# Patient Record
Sex: MALE | Race: WHITE | NOT HISPANIC OR LATINO | URBAN - METROPOLITAN AREA
[De-identification: names, ages, dates, MRNs, and addresses within clinical notes are randomized per-mention and may not be internally consistent; named-entity substitution may affect disease eponyms.]

---

## 2019-03-22 ENCOUNTER — HOSPITAL ENCOUNTER (OUTPATIENT)
Facility: HOSPITAL | Age: 49
Setting detail: OBSERVATION
Discharge: HOME | End: 2019-03-23
Attending: EMERGENCY MEDICINE | Admitting: INTERNAL MEDICINE
Payer: COMMERCIAL

## 2019-03-22 ENCOUNTER — APPOINTMENT (EMERGENCY)
Dept: RADIOLOGY | Facility: HOSPITAL | Age: 49
End: 2019-03-22
Attending: EMERGENCY MEDICINE
Payer: COMMERCIAL

## 2019-03-22 DIAGNOSIS — R07.9 CHEST PAIN, UNSPECIFIED TYPE: Primary | ICD-10-CM

## 2019-03-22 LAB
ALBUMIN SERPL-MCNC: 4.1 G/DL (ref 3.4–5)
ALP SERPL-CCNC: 62 IU/L (ref 35–126)
ALT SERPL-CCNC: 22 IU/L (ref 16–63)
ANION GAP SERPL CALC-SCNC: 10 MEQ/L (ref 3–15)
AST SERPL-CCNC: 23 IU/L (ref 15–41)
BILIRUB SERPL-MCNC: 0.4 MG/DL (ref 0.3–1.2)
BUN SERPL-MCNC: 18 MG/DL (ref 8–20)
CALCIUM SERPL-MCNC: 9.4 MG/DL (ref 8.9–10.3)
CHLORIDE SERPL-SCNC: 101 MEQ/L (ref 98–109)
CO2 SERPL-SCNC: 28 MEQ/L (ref 22–32)
CREAT SERPL-MCNC: 1.2 MG/DL
ERYTHROCYTE [DISTWIDTH] IN BLOOD BY AUTOMATED COUNT: 12.7 % (ref 11.6–14.4)
GFR SERPL CREATININE-BSD FRML MDRD: >60 ML/MIN/1.73M*2
GLUCOSE SERPL-MCNC: 134 MG/DL (ref 70–99)
HCT VFR BLDCO AUTO: 43 %
HGB BLD-MCNC: 14.4 G/DL
LIPASE SERPL-CCNC: 40 U/L (ref 20–51)
MCH RBC QN AUTO: 28.7 PG (ref 28–33.2)
MCHC RBC AUTO-ENTMCNC: 33.5 G/DL (ref 32.2–36.5)
MCV RBC AUTO: 85.7 FL (ref 83–98)
PDW BLD AUTO: 10.4 FL (ref 9.4–12.4)
PLATELET # BLD AUTO: 284 K/UL
POTASSIUM SERPL-SCNC: 3.9 MEQ/L (ref 3.6–5.1)
PROT SERPL-MCNC: 7.3 G/DL (ref 6–8.2)
RBC # BLD AUTO: 5.02 M/UL (ref 4.5–5.8)
SODIUM SERPL-SCNC: 139 MEQ/L (ref 136–144)
TROPONIN I SERPL-MCNC: <0.02 NG/ML
WBC # BLD AUTO: 8.67 K/UL

## 2019-03-22 PROCEDURE — 3E033NZ INTRODUCTION OF ANALGESICS, HYPNOTICS, SEDATIVES INTO PERIPHERAL VEIN, PERCUTANEOUS APPROACH: ICD-10-PCS | Performed by: INTERNAL MEDICINE

## 2019-03-22 PROCEDURE — 63700000 HC SELF-ADMINISTRABLE DRUG: Performed by: EMERGENCY MEDICINE

## 2019-03-22 PROCEDURE — 3E0337Z INTRODUCTION OF ELECTROLYTIC AND WATER BALANCE SUBSTANCE INTO PERIPHERAL VEIN, PERCUTANEOUS APPROACH: ICD-10-PCS | Performed by: INTERNAL MEDICINE

## 2019-03-22 PROCEDURE — 84484 ASSAY OF TROPONIN QUANT: CPT | Performed by: EMERGENCY MEDICINE

## 2019-03-22 PROCEDURE — 93005 ELECTROCARDIOGRAM TRACING: CPT | Performed by: EMERGENCY MEDICINE

## 2019-03-22 PROCEDURE — 93005 ELECTROCARDIOGRAM TRACING: CPT | Performed by: PHYSICIAN ASSISTANT

## 2019-03-22 PROCEDURE — 25800000 HC PHARMACY IV SOLUTIONS: Performed by: EMERGENCY MEDICINE

## 2019-03-22 PROCEDURE — 63600000 HC DRUGS/DETAIL CODE: Performed by: PHYSICIAN ASSISTANT

## 2019-03-22 PROCEDURE — 3E0337Z INTRODUCTION OF ELECTROLYTIC AND WATER BALANCE SUBSTANCE INTO PERIPHERAL VEIN, PERCUTANEOUS APPROACH: ICD-10-PCS | Performed by: EMERGENCY MEDICINE

## 2019-03-22 PROCEDURE — 63700000 HC SELF-ADMINISTRABLE DRUG: Performed by: PHYSICIAN ASSISTANT

## 2019-03-22 PROCEDURE — 71046 X-RAY EXAM CHEST 2 VIEWS: CPT

## 2019-03-22 PROCEDURE — 99220 PR INITIAL OBSERVATION CARE/DAY 70 MINUTES: CPT | Performed by: INTERNAL MEDICINE

## 2019-03-22 PROCEDURE — 80053 COMPREHEN METABOLIC PANEL: CPT | Performed by: EMERGENCY MEDICINE

## 2019-03-22 PROCEDURE — 25000000 HC PHARMACY GENERAL: Performed by: PHYSICIAN ASSISTANT

## 2019-03-22 PROCEDURE — 83690 ASSAY OF LIPASE: CPT | Performed by: EMERGENCY MEDICINE

## 2019-03-22 PROCEDURE — 96374 THER/PROPH/DIAG INJ IV PUSH: CPT

## 2019-03-22 PROCEDURE — G0378 HOSPITAL OBSERVATION PER HR: HCPCS

## 2019-03-22 PROCEDURE — 99285 EMERGENCY DEPT VISIT HI MDM: CPT | Mod: 25

## 2019-03-22 PROCEDURE — 3E0333Z INTRODUCTION OF ANTI-INFLAMMATORY INTO PERIPHERAL VEIN, PERCUTANEOUS APPROACH: ICD-10-PCS | Performed by: INTERNAL MEDICINE

## 2019-03-22 PROCEDURE — 3E033GC INTRODUCTION OF OTHER THERAPEUTIC SUBSTANCE INTO PERIPHERAL VEIN, PERCUTANEOUS APPROACH: ICD-10-PCS | Performed by: INTERNAL MEDICINE

## 2019-03-22 PROCEDURE — 96361 HYDRATE IV INFUSION ADD-ON: CPT

## 2019-03-22 PROCEDURE — 36415 COLL VENOUS BLD VENIPUNCTURE: CPT | Performed by: EMERGENCY MEDICINE

## 2019-03-22 PROCEDURE — 85027 COMPLETE CBC AUTOMATED: CPT | Performed by: EMERGENCY MEDICINE

## 2019-03-22 RX ORDER — ALUMINUM HYDROXIDE, MAGNESIUM HYDROXIDE, AND SIMETHICONE 1200; 120; 1200 MG/30ML; MG/30ML; MG/30ML
30 SUSPENSION ORAL ONCE
Status: COMPLETED | OUTPATIENT
Start: 2019-03-22 | End: 2019-03-22

## 2019-03-22 RX ORDER — TIMOLOL MALEATE 5 MG/ML
SOLUTION/ DROPS OPHTHALMIC
Refills: 3 | COMMUNITY
Start: 2019-02-18

## 2019-03-22 RX ORDER — METOPROLOL TARTRATE 1 MG/ML
5 INJECTION, SOLUTION INTRAVENOUS ONCE
Status: COMPLETED | OUTPATIENT
Start: 2019-03-22 | End: 2019-03-22

## 2019-03-22 RX ORDER — NAPROXEN SODIUM 220 MG/1
324 TABLET, FILM COATED ORAL ONCE
Status: COMPLETED | OUTPATIENT
Start: 2019-03-22 | End: 2019-03-22

## 2019-03-22 RX ORDER — TRAVOPROST 0.04 MG/ML
SOLUTION/ DROPS OPHTHALMIC
COMMUNITY
Start: 2018-02-15 | End: 2019-03-23

## 2019-03-22 RX ORDER — NITROGLYCERIN 0.3 MG/1
0.3 TABLET SUBLINGUAL EVERY 5 MIN PRN
Status: DISCONTINUED | OUTPATIENT
Start: 2019-03-22 | End: 2019-03-23 | Stop reason: HOSPADM

## 2019-03-22 RX ORDER — MORPHINE SULFATE 2 MG/ML
2 INJECTION, SOLUTION INTRAMUSCULAR; INTRAVENOUS ONCE
Status: COMPLETED | OUTPATIENT
Start: 2019-03-22 | End: 2019-03-22

## 2019-03-22 RX ADMIN — NITROGLYCERIN 0.3 MG: 0.3 TABLET SUBLINGUAL at 21:17

## 2019-03-22 RX ADMIN — NITROGLYCERIN 0.3 MG: 0.3 TABLET SUBLINGUAL at 21:22

## 2019-03-22 RX ADMIN — NITROGLYCERIN 1 INCH: 20 OINTMENT TOPICAL at 21:34

## 2019-03-22 RX ADMIN — MORPHINE SULFATE 2 MG: 2 INJECTION, SOLUTION INTRAMUSCULAR; INTRAVENOUS at 21:34

## 2019-03-22 RX ADMIN — ALUMINUM HYDROXIDE, MAGNESIUM HYDROXIDE, AND SIMETHICONE 30 ML: 200; 200; 20 SUSPENSION ORAL at 23:30

## 2019-03-22 RX ADMIN — ASPIRIN 81 MG 324 MG: 81 TABLET ORAL at 21:08

## 2019-03-22 RX ADMIN — NITROGLYCERIN 0.3 MG: 0.3 TABLET SUBLINGUAL at 21:10

## 2019-03-22 RX ADMIN — METOPROLOL TARTRATE 5 MG: 1 INJECTION, SOLUTION INTRAVENOUS at 23:31

## 2019-03-22 RX ADMIN — SODIUM CHLORIDE 500 ML: 9 INJECTION, SOLUTION INTRAVENOUS at 21:13

## 2019-03-22 ASSESSMENT — ENCOUNTER SYMPTOMS
HEADACHES: 0
NECK PAIN: 0
DIZZINESS: 0
NAUSEA: 0
ABDOMINAL PAIN: 0
NUMBNESS: 0
VOMITING: 0
TREMORS: 0

## 2019-03-23 VITALS
OXYGEN SATURATION: 96 % | HEIGHT: 67 IN | HEART RATE: 84 BPM | DIASTOLIC BLOOD PRESSURE: 79 MMHG | TEMPERATURE: 97.5 F | RESPIRATION RATE: 18 BRPM | SYSTOLIC BLOOD PRESSURE: 122 MMHG | WEIGHT: 206 LBS | BODY MASS INDEX: 32.33 KG/M2

## 2019-03-23 PROBLEM — H40.9 GLAUCOMA: Status: ACTIVE | Noted: 2019-03-23

## 2019-03-23 PROBLEM — G47.33 OSA ON CPAP: Status: ACTIVE | Noted: 2019-03-23

## 2019-03-23 PROBLEM — R94.31 ABNORMAL ELECTROCARDIOGRAM: Status: ACTIVE | Noted: 2019-03-23

## 2019-03-23 LAB
ATRIAL RATE: 75
ATRIAL RATE: 86
ATRIAL RATE: 90
ATRIAL RATE: 92
CHOLEST SERPL-MCNC: 163 MG/DL
HDLC SERPL-MCNC: 73 MG/DL
HDLC SERPL: 2.2 {RATIO}
LDLC SERPL CALC-MCNC: 83 MG/DL
NONHDLC SERPL-MCNC: 90 MG/DL
P AXIS: 15
P AXIS: 17
P AXIS: 28
P AXIS: 34
PR INTERVAL: 156
PR INTERVAL: 158
PR INTERVAL: 166
PR INTERVAL: 174
QRS DURATION: 100
QRS DURATION: 102
QRS DURATION: 104
QRS DURATION: 98
QT INTERVAL: 344
QT INTERVAL: 356
QT INTERVAL: 380
QT INTERVAL: 384
QTC CALCULATION(BAZETT): 420
QTC CALCULATION(BAZETT): 424
QTC CALCULATION(BAZETT): 440
QTC CALCULATION(BAZETT): 459
R AXIS: 23
R AXIS: 25
R AXIS: 41
R AXIS: 52
T WAVE AXIS: 41
T WAVE AXIS: 43
T WAVE AXIS: 48
T WAVE AXIS: 48
TRIGL SERPL-MCNC: 35 MG/DL (ref 30–149)
TROPONIN I SERPL-MCNC: <0.02 NG/ML
TROPONIN I SERPL-MCNC: <0.02 NG/ML
VENTRICULAR RATE: 75
VENTRICULAR RATE: 86
VENTRICULAR RATE: 90
VENTRICULAR RATE: 92

## 2019-03-23 PROCEDURE — G0378 HOSPITAL OBSERVATION PER HR: HCPCS

## 2019-03-23 PROCEDURE — 80061 LIPID PANEL: CPT | Performed by: INTERNAL MEDICINE

## 2019-03-23 PROCEDURE — 25000000 HC PHARMACY GENERAL: Performed by: INTERNAL MEDICINE

## 2019-03-23 PROCEDURE — 96361 HYDRATE IV INFUSION ADD-ON: CPT

## 2019-03-23 PROCEDURE — 94660 CPAP INITIATION&MGMT: CPT

## 2019-03-23 PROCEDURE — 36415 COLL VENOUS BLD VENIPUNCTURE: CPT | Performed by: INTERNAL MEDICINE

## 2019-03-23 PROCEDURE — 84484 ASSAY OF TROPONIN QUANT: CPT | Performed by: INTERNAL MEDICINE

## 2019-03-23 PROCEDURE — 63600000 HC DRUGS/DETAIL CODE: Performed by: INTERNAL MEDICINE

## 2019-03-23 PROCEDURE — 96375 TX/PRO/DX INJ NEW DRUG ADDON: CPT

## 2019-03-23 PROCEDURE — 96374 THER/PROPH/DIAG INJ IV PUSH: CPT

## 2019-03-23 PROCEDURE — 63700000 HC SELF-ADMINISTRABLE DRUG: Performed by: INTERNAL MEDICINE

## 2019-03-23 PROCEDURE — 96376 TX/PRO/DX INJ SAME DRUG ADON: CPT

## 2019-03-23 PROCEDURE — 93005 ELECTROCARDIOGRAM TRACING: CPT | Performed by: INTERNAL MEDICINE

## 2019-03-23 PROCEDURE — 99217 PR OBSERVATION CARE DISCHARGE MANAGEMENT: CPT | Performed by: HOSPITALIST

## 2019-03-23 RX ORDER — MORPHINE SULFATE 2 MG/ML
2 INJECTION, SOLUTION INTRAMUSCULAR; INTRAVENOUS ONCE
Status: COMPLETED | OUTPATIENT
Start: 2019-03-23 | End: 2019-03-23

## 2019-03-23 RX ORDER — COLCHICINE 0.6 MG/1
0.6 TABLET ORAL DAILY
Qty: 30 TABLET | Refills: 0 | Status: SHIPPED | OUTPATIENT
Start: 2019-03-23 | End: 2019-04-22

## 2019-03-23 RX ORDER — TRAVOPROST OPHTHALMIC SOLUTION 0.04 MG/ML
1 SOLUTION OPHTHALMIC NIGHTLY
COMMUNITY

## 2019-03-23 RX ORDER — COLCHICINE 0.6 MG/1
0.6 TABLET ORAL ONCE
Status: COMPLETED | OUTPATIENT
Start: 2019-03-23 | End: 2019-03-23

## 2019-03-23 RX ORDER — ACETAMINOPHEN 325 MG/1
650 TABLET ORAL EVERY 4 HOURS PRN
Status: DISCONTINUED | OUTPATIENT
Start: 2019-03-23 | End: 2019-03-23 | Stop reason: HOSPADM

## 2019-03-23 RX ORDER — ALUMINUM HYDROXIDE, MAGNESIUM HYDROXIDE, AND SIMETHICONE 1200; 120; 1200 MG/30ML; MG/30ML; MG/30ML
30 SUSPENSION ORAL EVERY 4 HOURS PRN
Status: DISCONTINUED | OUTPATIENT
Start: 2019-03-23 | End: 2019-03-23 | Stop reason: HOSPADM

## 2019-03-23 RX ORDER — TIMOLOL MALEATE 5 MG/ML
1 SOLUTION/ DROPS OPHTHALMIC DAILY
Status: DISCONTINUED | OUTPATIENT
Start: 2019-03-23 | End: 2019-03-23 | Stop reason: HOSPADM

## 2019-03-23 RX ORDER — ASPIRIN 325 MG
325 TABLET, DELAYED RELEASE (ENTERIC COATED) ORAL DAILY
Status: DISCONTINUED | OUTPATIENT
Start: 2019-03-23 | End: 2019-03-23 | Stop reason: HOSPADM

## 2019-03-23 RX ORDER — ACETAMINOPHEN 650 MG/20.3ML
650 LIQUID ORAL EVERY 4 HOURS PRN
Status: DISCONTINUED | OUTPATIENT
Start: 2019-03-23 | End: 2019-03-23 | Stop reason: HOSPADM

## 2019-03-23 RX ORDER — METOPROLOL TARTRATE 25 MG/1
25 TABLET, FILM COATED ORAL 2 TIMES DAILY
Status: DISCONTINUED | OUTPATIENT
Start: 2019-03-23 | End: 2019-03-23 | Stop reason: HOSPADM

## 2019-03-23 RX ORDER — NAPROXEN 500 MG/1
500 TABLET ORAL 2 TIMES DAILY WITH MEALS
Qty: 30 TABLET | Refills: 0 | Status: SHIPPED | OUTPATIENT
Start: 2019-03-23 | End: 2019-04-07

## 2019-03-23 RX ORDER — IBUPROFEN 200 MG
16-32 TABLET ORAL AS NEEDED
Status: DISCONTINUED | OUTPATIENT
Start: 2019-03-23 | End: 2019-03-23 | Stop reason: HOSPADM

## 2019-03-23 RX ORDER — KETOROLAC TROMETHAMINE 15 MG/ML
15 INJECTION, SOLUTION INTRAMUSCULAR; INTRAVENOUS ONCE
Status: COMPLETED | OUTPATIENT
Start: 2019-03-23 | End: 2019-03-23

## 2019-03-23 RX ORDER — POTASSIUM CHLORIDE 14.9 MG/ML
20 INJECTION INTRAVENOUS AS NEEDED
Status: DISCONTINUED | OUTPATIENT
Start: 2019-03-23 | End: 2019-03-23 | Stop reason: HOSPADM

## 2019-03-23 RX ORDER — FAMOTIDINE 10 MG/ML
20 INJECTION INTRAVENOUS ONCE
Status: COMPLETED | OUTPATIENT
Start: 2019-03-23 | End: 2019-03-23

## 2019-03-23 RX ORDER — ACETAMINOPHEN 650 MG/1
650 SUPPOSITORY RECTAL EVERY 4 HOURS PRN
Status: DISCONTINUED | OUTPATIENT
Start: 2019-03-23 | End: 2019-03-23 | Stop reason: HOSPADM

## 2019-03-23 RX ORDER — DEXTROSE 40 %
15-30 GEL (GRAM) ORAL AS NEEDED
Status: DISCONTINUED | OUTPATIENT
Start: 2019-03-23 | End: 2019-03-23 | Stop reason: HOSPADM

## 2019-03-23 RX ORDER — DEXTROSE 50 % IN WATER (D50W) INTRAVENOUS SYRINGE
25 AS NEEDED
Status: DISCONTINUED | OUTPATIENT
Start: 2019-03-23 | End: 2019-03-23 | Stop reason: HOSPADM

## 2019-03-23 RX ADMIN — COLCHICINE 0.6 MG: 0.6 TABLET, FILM COATED ORAL at 04:00

## 2019-03-23 RX ADMIN — METOPROLOL TARTRATE 25 MG: 25 TABLET, FILM COATED ORAL at 07:07

## 2019-03-23 RX ADMIN — KETOROLAC TROMETHAMINE 15 MG: 15 INJECTION, SOLUTION INTRAMUSCULAR; INTRAVENOUS at 03:59

## 2019-03-23 RX ADMIN — MORPHINE SULFATE 2 MG: 2 INJECTION, SOLUTION INTRAMUSCULAR; INTRAVENOUS at 00:49

## 2019-03-23 RX ADMIN — FAMOTIDINE 20 MG: 10 INJECTION INTRAVENOUS at 00:48

## 2019-03-23 RX ADMIN — TIMOLOL MALEATE 1 DROP: 5 SOLUTION/ DROPS OPHTHALMIC at 09:14

## 2019-03-23 RX ADMIN — ASPIRIN 325 MG: 325 TABLET, DELAYED RELEASE ORAL at 09:10

## 2019-03-23 ASSESSMENT — COGNITIVE AND FUNCTIONAL STATUS - GENERAL
DRESSING REGULAR UPPER BODY CLOTHING: 4 - NONE
DRESSING REGULAR LOWER BODY CLOTHING: 4 - NONE
STANDING UP FROM CHAIR USING ARMS: 4 - NONE
HELP NEEDED FOR PERSONAL GROOMING: 4 - NONE
HELP NEEDED FOR BATHING: 4 - NONE
MOVING TO AND FROM BED TO CHAIR: 4 - NONE
TOILETING: 4 - NONE
EATING MEALS: 4 - NONE
CLIMB 3 TO 5 STEPS WITH RAILING: 4 - NONE
WALKING IN HOSPITAL ROOM: 4 - NONE

## 2019-03-23 NOTE — PLAN OF CARE
Problem: Patient Care Overview  Goal: Plan of Care Review  Outcome: Ongoing (interventions implemented as appropriate)   03/23/19 1024   Coping/Psychosocial   Plan Of Care Reviewed With patient;spouse   Plan of Care Review   Progress improving   Outcome Summary Patient seen by cardiology, pericarditis per cardiology, chest discomfort improved with cholchine, for discharge home     Goal: Discharge Needs Assessment  Outcome: Ongoing (interventions implemented as appropriate)      Problem: Cardiac: ACS (Acute Coronary Syndrome) (Adult)  Goal: Signs and Symptoms of Listed Potential Problems Will be Absent, Minimized or Managed (Cardiac: ACS)  Outcome: Ongoing (interventions implemented as appropriate)

## 2019-03-23 NOTE — DISCHARGE INSTRUCTIONS
You were admitted to the hospital with pericarditis, which is inflammation of the lining of the heart . You were seen by Dr. Cheatham with cardiology who recommended anti-inflammatory medications and follow up in his office in 2 weeks. Continue the colchicine at least until you see Dr. Cheatham, and you can stop the naproxen after 3-5 days or taper it down to once per day when you start feeling better.    Medication changes:    CONTINUE all home meds    START colchicine 0.6 mg once per day -- this has been sent to your pharmacy  START naproxen 500 mg twice per day -- this has been sent to your pharmacy    Follow up plan:    See your PCP, Dr. To, within the next week    Follow up with Dr. Cheatham in 2 weeks, at which point he will want to get imaging of your heart with an echocardiogram, and he will also recommend whether or not to continue with colchicine. Call his office on Monday at [phone] to schedule an appointment.

## 2019-03-23 NOTE — ED ATTESTATION NOTE
I have personally seen, evaluated,and participated in the management of Pawel Doshi.  I reviewed and agree with the PA/NP's assessment and plan of care with the following exceptions: None    My examination, assessment, and plan of care for Pawel Doshi:  48-year-old male with a history of glaucoma kidney stones status post lithotripsy and also significant family history of early coronary disease presented with left-sided chest pain indigestion feeling central chest burning sensation that is since radiated to his back with aching sensation.  Exam:  Awake and alert mildly anxious, regular rate and rhythm without any no respiratory distress clear to auscultation no pedal edema.  Plan:  Monitorings, sublingual nitroglycerin along with aspirin chewed, EKG chest x-ray labs     Deon Topete MD  03/22/19 2057

## 2019-03-23 NOTE — NURSING NOTE
Spoke with Dr. Babin regarding pt's repeat EKG stating ST elevation/acute STEMI/MI. EKG sent to . Patient still reporting 3/10 chest pain described as tightness. Woken up from sleep to obtain EKG and timed blood work. Pt denies other symptoms with chest pain, no radiating pain.

## 2019-03-23 NOTE — DISCHARGE SUMMARY
"   Hospital Medicine Service -  Inpatient Discharge Summary        BRIEF OVERVIEW   Admitting Provider: Génesis Babin DO  Attending Provider: Vanita Hackett DO Attending phys phone: [phone]    PCP: Floyd To [phone]    Admission Date: 3/22/2019  Discharge Date: 3/23/2019     DISCHARGE DIAGNOSES      Primary Discharge Diagnosis  Chest pain    Secondary Discharge Diagnoses  Active Hospital Problems    Diagnosis Date Noted   • MIRIAM on CPAP 03/23/2019   • Glaucoma 03/23/2019   • Abnormal electrocardiogram 03/23/2019   • Chest pain 03/22/2019      Resolved Hospital Problems    Diagnosis Date Noted Date Resolved   No resolved problems to display.       Problem List on Day of Discharge  Glaucoma   Assessment & Plan    Continue eye drops at discharge        MIRIAM on CPAP   Assessment & Plan    Continue CPAP at discharge        * Chest pain   Assessment & Plan    EKG - T wave inversions in V1-V4.   BP elevated to 160's on arrival which has improved.  ACS vs GI vs Musculoskeletal vs pericarditis  Repeat EKG since admission more consistent with pericarditis  Seen by cardiology, likely pericarditis  Troponins negative x 3  Will dc on colchicine and naproxen          SUMMARY OF HOSPITALIZATION      Presenting Problem/History of Present Illness  Chest pain    This is a 48 y.o. year-old male admitted on 3/22/2019 with Chest pain [R07.9]  Chest pain, unspecified type [R07.9].    Pawel Doshi is a 48 y.o. male with a past medical history of glaucoma who presents with chest pain.   Patient states as he was driving home around 5:30pm felt \"noticeable\" pain on L side of chest described as \"burning\" rated 1-2/10. No other symptoms- SOB, diaphoresis, nausea, radiation of pain, dizziness, lightheadedness, HA. He got home around 6PM and some TUMS. He helped make dinner. Ate dinner consisting of salad, chicken nuggets with BBq sauce and a potato dish around 6:30pm. After dinner around 7:30pm sat down to watch a movie. " "About 1/2 hr later the pain \"grew\" and changed. Pain was going through his back into his shoulder. Pain described as tightness/aching 7-8/10. He says he walked around and did some stretches but the pain didn't go away. Had no other symptoms other than +chills and anxiety.   By 8:30PM as pain didn't ease up he came to ED. He took a baby ASA on the way.     He says he continued to have same level of pain on arrival to ED. In ED he was given 3 additional baby ASA and 3SL nitro and morphine 2mg and 1 inch Nitro paste  EKG showed anterior T wave inversions.     ED staff spoke with Dr. Cheatham who advised MAALOX, dose of IV LOPRESSOR then 25mg BID.     Patient states currently pain is \"noticeable\" 2-3/10.      States had similar symptoms yesterday. He was walking from [facility] to [facility] with a heavy bag. States had 1-2/10 \"noticeable\" left chest discomfort. He says resolved within minutes of getting to his destination.  He thought it was food related.  Prior to yesterday no episodes of CP.     Pain is not worsened with movement or positions or deep breathing.  No recent respiratory infections other than c/o postnasal drip.     Denies having cardiac stress test or ECHO.     Otherwise denies abd pain, n/v/d, dark or tarry or bloody stools. Denies urinary symptoms. Denies HA, lightheadedness, or dizziness.       Hospital Course    Patient was admitted to observation and repeat EKG noted MI in inferior leads with <1 mm ST elevation in aVF and continued TWI in V1-V4. Case was discussed with cardiology overnight and feeling was that EKG findings were more consistent with pericarditis. All troponins came back <0.02. Patient was given Toradol 15 mg and colchicine 0.6 mg. He was seen by cardiology the next day and confirmed to likely have pericarditis. He will be discharged today in stable condition on colchicine 0.6 mg daily and naproxen 500 BID and he is to wean the naproxen when his pain starts to improve. Follow up " with Dr. Cheatham in 2 weeks for echo. On morning of admission patient was resting comfortably and complaining of only 1-2/10 chest with no shortness of breath, no diaphoresis, no radiation to back or arm.    Exam on Day of Discharge  Physical Exam     Gen: NAD, appears stated age  HEENT: normocephalic, atraumatic  Pulm: CTABL, unlabored breathing, no wheeze/rales/rhonchi  CV: RRR, S1/S2 normal, intact distal pulses, no murmur/rub/gallop  No chest wall tenderness  Abd: soft, NT/ND, bowel sounds normal, no rebound/guarding  MSK: no injury or deformity  Neuro: AAOx3, non-focal  Psych: appropriate, cooperative  Ext: no cyanosis, clubbing, edema  Skin: warm, dry, intact      Consults During Admission  IP CONSULT TO CARDIOLOGY    DISCHARGE MEDICATIONS        Medication List      START taking these medications    colchicine 0.6 mg tablet  Commonly known as:  COLCRYS  Take 1 tablet (0.6 mg total) by mouth daily.     naproxen 500 mg tablet  Commonly known as:  NAPROSYN  Take 1 tablet (500 mg total) by mouth 2 (two) times a day with meals for 15 days.        CONTINUE taking these medications    GINKGO BILOBA ORAL  Take by mouth daily.     RED WINE COMPLEX 200-60 mg capsule  Generic drug:  resver-wine-bfl-pchyt-is-E-grp  Take by mouth.     THERALITH XR 3.75-45-45-49.5 mg tablet extended release  Generic drug:  vit B6-mag cit,oxid-potass cit  Take by mouth daily.     timolol 0.5 % ophthalmic solution  Commonly known as:  TIMOPTIC  INSTILL 1 DROP INTO BOTH EYES EVERY DAY     TRAVATAN Z 0.004 % drops  Generic drug:  travoprost  Administer 1 drop into both eyes nightly.     TURMERIC ORAL  Take by mouth daily.            Instructions for after discharge     Follow-up with Provider:       Instructions for follow-up:  Follow up with Dr. Cheatham in 2 weeks, call on Monday to schedule appointment    Follow-up with primary physician (PCP)       Instructions for follow-up:  follow up with Dr. To within the week              PROCEDURES / LABS / IMAGING      Operative Procedures  none    Other Procedures  none    Pertinent Labs    ABG        Troponins + BNP    Results from last 7 days  Lab Units 03/23/19  0849 03/23/19  0308 03/22/19 2109   TROPONIN I ng/mL <0.02 <0.02 <0.02       BMP + Lipase + Magnesium + Phosphate + TSH    Results from last 7 days  Lab Units 03/22/19  2109   SODIUM mEQ/L 139   POTASSIUM mEQ/L 3.9   CHLORIDE mEQ/L 101   CO2 mEQ/L 28   BUN mg/dL 18   CREATININE mg/dL 1.2   GLUCOSE mg/dL 134*   CALCIUM mg/dL 9.4       Complete Blood Count    Results from last 7 days  Lab Units 03/22/19  2109   WBC K/uL 8.67   RBC M/uL 5.02   HEMOGLOBIN g/dL 14.4   HEMATOCRIT % 43.0   MCV fL 85.7   MCH pg 28.7   MCHC g/dL 33.5   PLATELETS K/uL 284       Lipid Panel + Lipase    Results from last 7 days  Lab Units 03/23/19  0308 03/22/19 2109   CHOLESTEROL mg/dL 163  --    TRIGLYCERIDES mg/dL 35  --    HDL mg/dL 73  --    LDL CALC mg/dL 83  --    LIPASE U/L  --  40       Liver Function Tests + INR    Results from last 7 days  Lab Units 03/22/19 2109   ALK PHOS IU/L 62   BILIRUBIN TOTAL mg/dL 0.4   ALBUMIN g/dL 4.1   ALT IU/L 22   AST IU/L 23       Micro  Microbiology Results     ** No results found for the last 720 hours. **          Urine        Pertinent Imaging  X-ray Chest 2 Views    Result Date: 3/23/2019  IMPRESSION: No acute cardiopulmonary disease.       OUTPATIENT  FOLLOW-UP / REFERRALS / PENDING TESTS        Outpatient Follow-Up Appointments  Encounter Information     You do not currently have any appointments scheduled.          Referrals  No orders of the defined types were placed in this encounter.      Test Results Pending at Discharge  Unresulted Labs     Start     Ordered    03/23/19 0900  Troponin I  Every 6 hours     Start Status   03/23/19 1500 Needs to be Collected   03/24/19 0000 Needs to be Collected       03/23/19 0413          Important Issues to Address in Follow-Up  Follow up with PCP 1 week  Follow up with  Dr. Cheatham 2 weeks for echo    DISCHARGE DISPOSITION      Disposition: Home     Code Status At Discharge: Full Code    Physician Order for Life-Sustaining Treatment Document Status      No documents found

## 2019-03-23 NOTE — ASSESSMENT & PLAN NOTE
Early changes of pericarditis with CT depression and T wave inversion now evolving into ST elevation. No troponin rise to suggest acute ischemic process.

## 2019-03-23 NOTE — NURSING NOTE
Patient discharged to home by MD. Discharge instructions and medications reviewed with patient and wife. Patient and wife verbalizing understanding. Patient left ambulatory with wife.

## 2019-03-23 NOTE — ED PROVIDER NOTES
"HPI     Chief Complaint   Patient presents with   • Chest Pain       Patient reports about 530 this evening he was driving home when he developed what he thought initially was mild indigestion left side of his chest with radiation to his left shoulder blade.  Patient states as the over the last 3 hours the pain has been increasing.  Patient states the pain is been constant is now 5 or 6 out of 10 initially described as indigestion now \"muscular\" pain.  Patient states nothing is aware of makes the pain better or worse.  He denies any nauseous sweating or shortness of breath with it.  Patient denies any radiation down his arms or up to his neck.  He denies any visual change or paresthesias in his extremities.  Patient states he has never had pain quite like this before.  He does admit to some mild chest pain yesterday with walking but he quantifies this as he is walking a long distance.  Patient denies any ripping or tearing pain in his chest belly or back.  Pain has not been migratory  Patient states both mom and father have coronary disease with stents.  He denies any known family history of aortic aneurysms or dissections.             Patient History     Past Medical History:   Diagnosis Date   • Glaucoma    • Kidney calculi        Past Surgical History:   Procedure Laterality Date   • LITHOTRIPSY     • TONSILLECTOMY         Family History   Problem Relation Age of Onset   • Coronary artery disease Mother         cabg and stent age 60's   • Hyperlipidemia Mother    • Hypertension Mother    • Hyperlipidemia Father    • Lung cancer Father         h/o smoker   • Autoimmune disease Sister         dermatomyositis       Social History   Substance Use Topics   • Smoking status: Never Smoker   • Smokeless tobacco: Never Used   • Alcohol use Yes      Comment: social       Systems Reviewed from Nursing Triage:  Tobacco  Meds  Med Hx  Surg Hx  Fam Hx  Soc Hx         Review of Systems     Review of Systems   Eyes: " Negative for visual disturbance.   Gastrointestinal: Negative for abdominal pain, nausea and vomiting.   Musculoskeletal: Negative for neck pain.   Neurological: Negative for dizziness, tremors, numbness and headaches.   All other systems reviewed and are negative.       Physical Exam     ED Triage Vitals [03/22/19 2057]   Temp Heart Rate Resp BP SpO2   37 °C (98.6 °F) 96 20 (!) 161/96 98 %      Temp Source Heart Rate Source Patient Position BP Location FiO2 (%) (Set)   Tympanic Monitor Lying Right upper arm --       Pulse Ox %: 95 % (03/22/19 2144)  Pulse Ox Interpretation: Normal (03/22/19 2144)  Heart Rate: 86 (03/22/19 2144)  Rhythm Strip Interpretation: Normal Sinus Rhythm (03/22/19 2144)    Patient Vitals for the past 24 hrs:   BP Temp Temp src Pulse Resp SpO2 Weight   03/23/19 0115 - - - 86 - - 93.4 kg (206 lb)   03/23/19 0105 (!) 142/79 37.1 °C (98.8 °F) Oral - 20 97 % 93.4 kg (206 lb)   03/23/19 0030 134/85 - - 78 (!) 22 96 % -   03/23/19 0000 126/70 - - 80 (!) 26 97 % -   03/22/19 2331 135/89 - - 76 - - -   03/22/19 2315 136/76 - - 78 20 96 % -   03/22/19 2215 130/76 - - 78 17 95 % -   03/22/19 2139 (!) 145/76 - - 80 (!) 23 95 % -   03/22/19 2134 (!) 150/71 - - 86 - - -   03/22/19 2122 (!) 143/84 - - 92 15 95 % -   03/22/19 2117 (!) 152/88 - - 98 - - -   03/22/19 2115 (!) 152/88 - - 90 19 95 % -   03/22/19 2110 (!) 163/99 - - 87 - - -   03/22/19 2057 (!) 161/96 37 °C (98.6 °F) Tympanic 96 20 98 % -           Physical Exam   Constitutional: He is oriented to person, place, and time. He appears well-developed.   HENT:   Head: Normocephalic and atraumatic.   Eyes: Pupils are equal, round, and reactive to light.   Cardiovascular: Normal rate, regular rhythm, normal heart sounds and intact distal pulses.    Pulmonary/Chest: Effort normal and breath sounds normal. He has no wheezes. He has no rales.   Abdominal: Soft. Bowel sounds are normal. There is tenderness.   Musculoskeletal: Normal range of motion.    Pulses 2+ bilaterally radial pulses 2+ bilateral femoral pulses 2+ bilateral   Neurological: He is alert and oriented to person, place, and time.   Skin: Skin is warm.   Psychiatric: He has a normal mood and affect.   Vitals reviewed.           Procedures    ED Course & MDM     Labs Reviewed   COMPREHENSIVE METABOLIC PANEL - Abnormal        Result Value    Sodium 139      Potassium 3.9      Chloride 101      CO2 28      BUN 18      Creatinine 1.2      Glucose 134 (*)     Calcium 9.4      AST (SGOT) 23      ALT (SGPT) 22      Alkaline Phosphatase 62      Total Protein 7.3      Albumin 4.1      Bilirubin, Total 0.4      eGFR >60.0      Anion Gap 10     CBC - Normal    WBC 8.67      RBC 5.02      Hemoglobin 14.4      Hematocrit 43.0      MCV 85.7      MCH 28.7      MCHC 33.5      RDW 12.7      Platelets 284      MPV 10.4     LIPASE - Normal    Lipase 40     TROPONIN I - Normal    Troponin I <0.02     TROPONIN I   TROPONIN I   TROPONIN I   LIPID PROFILE       ECG 12 lead   ED Interpretation   Repeat EKG is sinus at 86 bpm with nonspecific ST-T wave changes and inverted T's aVR V1 V2 V3 V4 unchanged from EKG from 1/2-hour ago EKG was read by attending            ECG 12 lead   ED Interpretation   EKG is sinus at 96 bpm with nonspecific ST-T wave changes there is T wave inversion in aVR V1 V2 V3 V4 EKG was read by attending      ECG 12 lead    (Results Pending)   X-RAY CHEST 2 VIEWS    (Results Pending)   ECG 12 lead    (Results Pending)               [facility]         ED Course as of Mar 23 0136   Fri Mar 22, 2019   2127 Pt has had 3 sl NTG pain has improved was 6/10 now 3/10 without radiation to back.   [JR]   2215 Post morphine pain is improved but still present about 1 or 2 out of 10.  Patient denies any shortness of breath nauseousness or sweating.  I have a page out to cardiology  [JR]   8744 Spoke with Dr. Cheatham in detail.  He recommends metoprolol 5 mg IV now then starting 25 mg twice daily.  And try GI cocktail.  [facility]  to admit and keep on their service but cardiology will evaluate and consult.  [JR]      ED Course User Index  [JR] Jason Westbrook PA C         Clinical Impressions as of Mar 23 0136   Chest pain, unspecified type        Jason Westbrook PA C  03/23/19 0136

## 2019-03-23 NOTE — H&P
"   Hospital Medicine Service -  History & Physical        CHIEF COMPLAINT   Chest pain     HISTORY OF PRESENT ILLNESS      Pawel Doshi is a 48 y.o. male with a past medical history of glaucoma who presents with chest pain.   Patient states as he was driving home around 5:30pm felt \"noticeable\" pain on L side of chest described as \"burning\" rated 1-2/10. No other symptoms- SOB, diaphoresis, nausea, radiation of pain, dizziness, lightheadedness, HA. He got home around 6PM and some TUMS. He helped make dinner. Ate dinner consisting of salad, chicken nuggets with BBq sauce and a potato dish around 6:30pm. After dinner around 7:30pm sat down to watch a movie. About 1/2 hr later the pain \"grew\" and changed. Pain was going through his back into his shoulder. Pain described as tightness/aching 7-8/10. He says he walked around and did some stretches but the pain didn't go away. Had no other symptoms other than +chills and anxiety.   By 8:30PM as pain didn't ease up he came to ED. He took a baby ASA on the way.    He says he continued to have same level of pain on arrival to ED. In ED he was given 3 additional baby ASA and 3SL nitro and morphine 2mg and 1 inch Nitro paste  EKG showed anterior T wave inversions.    ED staff spoke with Dr. Cheatham who advised MAALOX, dose of IV LOPRESSOR then 25mg BID.    Patient states currently pain is \"noticeable\" 2-3/10.     States had similar symptoms yesterday. He was walking from [place] to [employer] [place] with a heavy bag. States had 1-2/10 \"noticeable\" left chest discomfort. He says resolved within minutes of getting to his destination.  He thought it was food related.  Prior to yesterday no episodes of CP.    Pain is not worsened with movement or positions or deep breathing.  No recent respiratory infections other than c/o postnasal drip.    Denies having cardiac stress test or ECHO.    Otherwise denies abd pain, n/v/d, dark or tarry or bloody stools. Denies urinary symptoms. " Denies HA, lightheadedness, or dizziness.    PAST MEDICAL AND SURGICAL HISTORY      Past Medical History:   Diagnosis Date   • Glaucoma    • Kidney calculi    • MIRIAM on CPAP        Past Surgical History:   Procedure Laterality Date   • LITHOTRIPSY     • TONSILLECTOMY         PCP: Floyd To MD    MEDICATIONS      Prior to Admission medications    Medication Sig Start Date End Date Taking? Authorizing Provider     Prescriptions Prior to Admission   Medication Sig Dispense Refill Last Dose   • travoprost (TRAVATAN Z) 0.004 % drops Administer 1 drop into both eyes nightly.      • TURMERIC ORAL Take by mouth daily.      • vit B6-mag cit,oxid-potass cit (THERALITH XR) 3.75-45-45-49.5 mg tablet extended release Take by mouth daily.      • GINKGO BILOBA ORAL Take by mouth daily.        • resver-wine-bfl-dfdxw-ie-Y-grp (RED WINE COMPLEX) 200-60 mg capsule Take by mouth.      • timolol (TIMOPTIC) 0.5 % ophthalmic solution INSTILL 1 DROP INTO BOTH EYES EVERY DAY  3          ALLERGIES      Patient has no known allergies.    FAMILY HISTORY      Family History   Problem Relation Age of Onset   • Coronary artery disease Mother         cabg and stent age 60's   • Hyperlipidemia Mother    • Hypertension Mother    • Hyperlipidemia Father    • Lung cancer Father         h/o smoker   • Autoimmune disease Sister         dermatomyositis       SOCIAL HISTORY      Social History     Social History   • Marital status:      Spouse name: N/A   • Number of children: 2   • Years of education: N/A     Social History Main Topics   • Smoking status: Never Smoker   • Smokeless tobacco: Never Used   • Alcohol use Yes      Comment: social   • Drug use: No   • Sexual activity: Not Asked     Other Topics Concern   • None     Social History Narrative   • None       REVIEW OF SYSTEMS      All other systems reviewed and negative except as noted in HPI    PHYSICAL EXAMINATION      Temp:  [37 °C (98.6 °F)-37.1 °C (98.8 °F)] 37.1 °C (98.8  °F)  Heart Rate:  [76-98] 90  Resp:  [15-26] 20  BP: (126-163)/(70-99) 126/84  FiO2 (%) (Set):  [21 %] 21 %  Body mass index is 32.26 kg/m².    Physical Exam   Constitutional: He is oriented to person, place, and time. He appears well-developed and well-nourished. No distress.   HENT:   Head: Normocephalic and atraumatic.   Mouth/Throat: Oropharynx is clear and moist. No oropharyngeal exudate.   Eyes: Conjunctivae and EOM are normal. Pupils are equal, round, and reactive to light. No scleral icterus.   Neck: Normal range of motion. Neck supple. No JVD present.   Cardiovascular: Regular rhythm, normal heart sounds and intact distal pulses.    No murmur heard.  Pulmonary/Chest: Breath sounds normal. No respiratory distress. He has no wheezes. He has no rales.   Abdominal: Soft. Bowel sounds are normal. He exhibits no distension. There is no tenderness. There is no rebound and no guarding.   Musculoskeletal: Normal range of motion. He exhibits no edema.   No chest tenderness to palpation   Neurological: He is alert and oriented to person, place, and time. No cranial nerve deficit or sensory deficit.   Skin: Skin is warm and dry.   Psychiatric: He has a normal mood and affect.   Nursing note and vitals reviewed.      LABS / IMAGING / EKG        Labs  I have reviewed the patient's pertinent labs. Pertinent labs are within normal limits.    Imaging  I have independently reviewed the patient's pertinent imaging for this hospital visit. CXR reviewed by me: heart size normal. No focal lung opacities noted.     ECG/Telemetry  I have independently reviewed the ECG.   March 22 2019 20:51PM- NSR  92bpm. T wave inversions V1-V4  March 22 2019 21:35PM- NSR. Incomplete RBBB. Twave inversions V1-V4    ASSESSMENT AND PLAN           * Chest pain   Assessment & Plan    A: Trop neg. EKG - T wave inversions in V1-V4. No prior EKG for comparison.   BP elevated to 160's on arrival which has improved.  Concern for ACS vs GI vs  Musculoskeletal vs pericarditis.    P: CP r/o  Trend trop  Continue ASA 325mg daily  Lipid panel   Repeat EKG with trop    @ 3:09AM RN called stating repeat EKG reads acute MI in inferior leads.  On review of EKG- <1mm ST elevation in lead avF. Continues to have T wave inversions V1-V4.   Patient continues to have 2-3/10 left chest pain. However resting comfortably with CPAP on when I walked into room.   I spoke with Dr. Cheatham around 3:30AM. Reviewed case and EKG finding. He thinks this is pericarditis.   Patient's second trop also <0.02. There is 1mm VT depression in inferior leads on current KEG.  Dr. Cheatham advised 15mg TORADOL and 0.6mg Colchicine.        Glaucoma   Assessment & Plan    Continue eye drops        MIRIAM on CPAP   Assessment & Plan    Continue CPAP             VTE Assessment: Pad[place] VTE Score: 3  VTE Prophylaxis Plan: SCD  Code Status: Full Code  Estimated Discharge Date: 3/23/2019  Disposition Planning: pending cards louise Babin,   3/23/2019

## 2019-03-23 NOTE — ASSESSMENT & PLAN NOTE
EKG - T wave inversions in V1-V4.   BP elevated to 160's on arrival which has improved.  ACS vs GI vs Musculoskeletal vs pericarditis  Repeat EKG since admission more consistent with pericarditis  Seen by cardiology, likely pericarditis  Troponins negative x 3  Will dc on colchicine and naproxen

## 2019-03-23 NOTE — CONSULTS
"Cardiology Consult Note  Subjective     Pawel Doshi is a 48 y.o. male who was admitted for Chest pain [R07.9]  Chest pain, unspecified type [R07.9]. Patient was referred by [facility] Dr Babin for management recommendations. Patient is admitted with nonexertional CP starting around dinner last night with associated T wave inversions on ECG. No prior h/o CP but POS FH (mother). Non smoker and no h/o DM or tobacco. Toponin neg X 2 so far but ECG showing evolutionalr change and pain now only on deep breath after receiving antiinflammatory therapy overnight.    Outside records reviewed. Pertinent radiology results reviewed. Pertinent lab results reviewed..    Medical History:   Past Medical History:   Diagnosis Date   • Glaucoma    • Kidney calculi    • MIRIAM on CPAP        Surgical History:   Past Surgical History:   Procedure Laterality Date   • LITHOTRIPSY     • TONSILLECTOMY         Social History:   Social History     Social History Narrative   • No narrative on file       Family History:   Family History   Problem Relation Age of Onset   • Coronary artery disease Mother         cabg and stent age 60's   • Hyperlipidemia Mother    • Hypertension Mother    • Hyperlipidemia Father    • Lung cancer Father         h/o smoker   • Autoimmune disease Sister         dermatomyositis       Allergies: Patient has no known allergies.    [unfilled]    Review of Systems  All other systems reviewed and negative except as noted in the HPI.    Objective   Labs  I have reviewed the patient's labs.  Current labs are within normal limits.    Imaging  I have independently reviewed the pertinent imaging from the last 24 hrs.    ECG   sinus rhythm, anterior T wave inversions, inferior MT depression and ST elevation    Telemetry  sinus rhythm      Physical Exam  /80 (BP Location: Right upper arm, Patient Position: Lying)   Pulse 99   Temp 36.7 °C (98.1 °F) (Oral)   Resp 18   Ht 1.702 m (5' 7\")   Wt 93.4 kg (206 lb)   SpO2 95%   BMI " 32.26 kg/m²     General Appearance:  Alert, no distress   Head:  Normocephalic, without obvious abnormality, atraumatic   Eyes:  Conjunctiva/corneas clear, EOM's intact   Endocrine: No thyroid enlargement    Lungs:   Clear to auscultation bilaterally, respirations unlabored, no rales, no wheezing   Heart:  Regular rhythm, S1 and S2 normal, no murmur, rub or gallop   Abdomen:   Soft, non-tender, no masses, no organomegaly   Vascular: Pulses 2+ and symmetric all extremities, no carotid bruit or jugular vein distention   Musculoskeletal:  Skin: No injury or deformity  Skin color, texture, turgor normal, no rashes or lesions, no cyanosis or edema   Extremities: Extremities normal, atraumatic   Behavior/Emotional: Appropriate, cooperative             Assessment   48 y.o. male being consulted for management recommendations       Plan     Abnormal electrocardiogram   Assessment & Plan    Early changes of pericarditis with CT depression and T wave inversion now evolving into ST elevation. No troponin rise to suggest acute ischemic process.        * Chest pain   Assessment & Plan    Non exertional CP starting yesterday and persisting for last 18 hs with residual inspiratory component and ECG changes inclding T wave inversion, CT depressions all c/w pericarditis in absence of troponin elevation. Colchicine and NSAID rx adequate. Will get echo in follow up in 2-3 weeks [phone] (office #)        MIRIAM on CPAP   Assessment & Plan    Asymptomatic

## 2019-12-02 NOTE — ASSESSMENT & PLAN NOTE
Non exertional CP starting yesterday and persisting for last 18 hs with residual inspiratory component and ECG changes inclding T wave inversion, SC depressions all c/w pericarditis in absence of troponin elevation. Colchicine and NSAID rx adequate. Will get echo in follow up in 2-3 weeks [phone] (office #)   negative...

## 2021-04-13 DIAGNOSIS — Z23 ENCOUNTER FOR IMMUNIZATION: ICD-10-CM
